# Patient Record
Sex: FEMALE | Race: WHITE
[De-identification: names, ages, dates, MRNs, and addresses within clinical notes are randomized per-mention and may not be internally consistent; named-entity substitution may affect disease eponyms.]

---

## 2019-07-03 ENCOUNTER — HOSPITAL ENCOUNTER (INPATIENT)
Dept: HOSPITAL 88 - ER | Age: 84
LOS: 4 days | Discharge: HOME HEALTH | DRG: 193 | End: 2019-07-07
Attending: INTERNAL MEDICINE | Admitting: INTERNAL MEDICINE
Payer: MEDICARE

## 2019-07-03 VITALS — WEIGHT: 111.06 LBS | HEIGHT: 62 IN | BODY MASS INDEX: 20.44 KG/M2

## 2019-07-03 DIAGNOSIS — F03.91: ICD-10-CM

## 2019-07-03 DIAGNOSIS — I10: ICD-10-CM

## 2019-07-03 DIAGNOSIS — M62.82: ICD-10-CM

## 2019-07-03 DIAGNOSIS — G93.41: ICD-10-CM

## 2019-07-03 DIAGNOSIS — R74.0: ICD-10-CM

## 2019-07-03 DIAGNOSIS — J18.1: Primary | ICD-10-CM

## 2019-07-03 DIAGNOSIS — E78.5: ICD-10-CM

## 2019-07-03 LAB
ALBUMIN SERPL-MCNC: 2.7 G/DL (ref 3.5–5)
ALBUMIN/GLOB SERPL: 0.6 {RATIO} (ref 0.8–2)
ALP SERPL-CCNC: 91 IU/L (ref 40–150)
ALT SERPL-CCNC: 39 IU/L (ref 0–55)
ANION GAP SERPL CALC-SCNC: 17.3 MMOL/L (ref 8–16)
BACTERIA URNS QL MICRO: (no result) /HPF
BASOPHILS # BLD AUTO: 0.1 10*3/UL (ref 0–0.1)
BASOPHILS NFR BLD AUTO: 0.3 % (ref 0–1)
BILIRUB UR QL: NEGATIVE
BUN SERPL-MCNC: 31 MG/DL (ref 7–26)
BUN/CREAT SERPL: 34 (ref 6–25)
CALCIUM SERPL-MCNC: 12 MG/DL (ref 8.4–10.2)
CHLORIDE SERPL-SCNC: 103 MMOL/L (ref 98–107)
CK MB SERPL-MCNC: 5.6 NG/ML (ref 0–5)
CK SERPL-CCNC: 1155 IU/L (ref 29–168)
CLARITY UR: CLEAR
CO2 SERPL-SCNC: 25 MMOL/L (ref 22–29)
COLOR UR: YELLOW
DEPRECATED NEUTROPHILS # BLD AUTO: 12.1 10*3/UL (ref 2.1–6.9)
DEPRECATED RBC URNS MANUAL-ACNC: (no result) /HPF (ref 0–5)
EGFRCR SERPLBLD CKD-EPI 2021: 59 ML/MIN (ref 60–?)
EOSINOPHIL # BLD AUTO: 0.1 10*3/UL (ref 0–0.4)
EOSINOPHIL NFR BLD AUTO: 0.7 % (ref 0–6)
EPI CELLS URNS QL MICRO: (no result) /LPF
ERYTHROCYTE [DISTWIDTH] IN CORD BLOOD: 14.1 % (ref 11.7–14.4)
GLOBULIN PLAS-MCNC: 4.9 G/DL (ref 2.3–3.5)
GLUCOSE SERPLBLD-MCNC: 115 MG/DL (ref 74–118)
HCT VFR BLD AUTO: 39.5 % (ref 34.2–44.1)
HGB BLD-MCNC: 12.7 G/DL (ref 12–16)
KETONES UR QL STRIP.AUTO: NEGATIVE
LEUKOCYTE ESTERASE UR QL STRIP.AUTO: NEGATIVE
LIPASE SERPL-CCNC: 33 U/L (ref 8–78)
LYMPHOCYTES # BLD: 1.3 10*3/UL (ref 1–3.2)
LYMPHOCYTES NFR BLD AUTO: 8.7 % (ref 18–39.1)
MCH RBC QN AUTO: 30 PG (ref 28–32)
MCHC RBC AUTO-ENTMCNC: 32.2 G/DL (ref 31–35)
MCV RBC AUTO: 93.2 FL (ref 81–99)
MONOCYTES # BLD AUTO: 1.1 10*3/UL (ref 0.2–0.8)
MONOCYTES NFR BLD AUTO: 7.3 % (ref 4.4–11.3)
NEUTS SEG NFR BLD AUTO: 82.6 % (ref 38.7–80)
NITRITE UR QL STRIP.AUTO: NEGATIVE
PLATELET # BLD AUTO: 374 X10E3/UL (ref 140–360)
POTASSIUM SERPL-SCNC: 4.3 MMOL/L (ref 3.5–5.1)
PROT UR QL STRIP.AUTO: (no result)
RBC # BLD AUTO: 4.24 X10E6/UL (ref 3.6–5.1)
SODIUM SERPL-SCNC: 141 MMOL/L (ref 136–145)
SP GR UR STRIP: 1.02 (ref 1.01–1.02)
UROBILINOGEN UR STRIP-MCNC: 0.2 MG/DL (ref 0.2–1)
WBC #/AREA URNS HPF: (no result) /HPF (ref 0–5)

## 2019-07-03 PROCEDURE — 82140 ASSAY OF AMMONIA: CPT

## 2019-07-03 PROCEDURE — 83880 ASSAY OF NATRIURETIC PEPTIDE: CPT

## 2019-07-03 PROCEDURE — 82550 ASSAY OF CK (CPK): CPT

## 2019-07-03 PROCEDURE — 99284 EMERGENCY DEPT VISIT MOD MDM: CPT

## 2019-07-03 PROCEDURE — 83970 ASSAY OF PARATHORMONE: CPT

## 2019-07-03 PROCEDURE — 82553 CREATINE MB FRACTION: CPT

## 2019-07-03 PROCEDURE — 93005 ELECTROCARDIOGRAM TRACING: CPT

## 2019-07-03 PROCEDURE — 83735 ASSAY OF MAGNESIUM: CPT

## 2019-07-03 PROCEDURE — 83690 ASSAY OF LIPASE: CPT

## 2019-07-03 PROCEDURE — 80076 HEPATIC FUNCTION PANEL: CPT

## 2019-07-03 PROCEDURE — 84484 ASSAY OF TROPONIN QUANT: CPT

## 2019-07-03 PROCEDURE — 70450 CT HEAD/BRAIN W/O DYE: CPT

## 2019-07-03 PROCEDURE — 80053 COMPREHEN METABOLIC PANEL: CPT

## 2019-07-03 PROCEDURE — 82306 VITAMIN D 25 HYDROXY: CPT

## 2019-07-03 PROCEDURE — 94640 AIRWAY INHALATION TREATMENT: CPT

## 2019-07-03 PROCEDURE — 87086 URINE CULTURE/COLONY COUNT: CPT

## 2019-07-03 PROCEDURE — 83605 ASSAY OF LACTIC ACID: CPT

## 2019-07-03 PROCEDURE — 87040 BLOOD CULTURE FOR BACTERIA: CPT

## 2019-07-03 PROCEDURE — 97139 UNLISTED THERAPEUTIC PX: CPT

## 2019-07-03 PROCEDURE — 81001 URINALYSIS AUTO W/SCOPE: CPT

## 2019-07-03 PROCEDURE — 36415 COLL VENOUS BLD VENIPUNCTURE: CPT

## 2019-07-03 PROCEDURE — 85025 COMPLETE CBC W/AUTO DIFF WBC: CPT

## 2019-07-03 PROCEDURE — 92522 EVALUATE SPEECH PRODUCTION: CPT

## 2019-07-03 PROCEDURE — 71045 X-RAY EXAM CHEST 1 VIEW: CPT

## 2019-07-03 PROCEDURE — 80048 BASIC METABOLIC PNL TOTAL CA: CPT

## 2019-07-03 RX ADMIN — CEFTRIAXONE SCH GM: 100 INJECTION, POWDER, FOR SOLUTION INTRAVENOUS at 23:43

## 2019-07-03 NOTE — DIAGNOSTIC IMAGING REPORT
EXAMINATION:  CHEST SINGLE (PORTABLE)    



INDICATION:      Elevated WBC



COMPARISON:  None

     

FINDINGS:  AP view   



TUBES and LINES:  None.



LUNGS:  Lungs are hyper inflated.  Bilateral peribronchial cuffing. Focal

airspace opacity in the right upper lobe.



PLEURA:  No pleural effusion or pneumothorax.



HEART AND MEDIASTINUM:  The cardiomediastinal silhouette is unremarkable. There

are atherosclerotic calcifications within the aorta.



BONES AND SOFT TISSUES:  No acute osseous lesion.  Soft tissues are

unremarkable.



UPPER ABDOMEN: No free air under the diaphragm.    



IMPRESSION: 

Right upper lobe pneumonia.





Signed by: Dr. Justus Colbert M.D. on 7/3/2019 9:50 PM

## 2019-07-03 NOTE — DIAGNOSTIC IMAGING REPORT
History:AMS

Comparison studies:None



Technique:

Axial images were obtained from the skull base to the vertex.

Coronal and sagittal images reconstructed from the axial data.

Intravenous contrast: None

Dose modulation, iterative reconstruction, and/or weight based adjustment of

the mA/kV was utilized to reduce the radiation dose to as low as reasonably

achievable.



Findings:



Scalp/skull: 

No abnormalities.



Extra-axial spaces: 

No masses.  No fluid collections.



Brain sulci: Severely prominent at the frontal temporal and parietal

convexities.

Ventricles: Moderate compensatory dilatation. Severe dilation of the left

temporal horn. No hydrocephalus.



Parenchyma: 

Few hypodensities in the supratentorial white matter are small vessel ischemic

changes. No masses, hemorrhage, acute or chronic cortical vascular insults.



Sellar/suprasellar region: No abnormalities.

Craniocervical junction: Patent foramen magnum.  No Chiari one malformation.



Incidental findings:

Atherosclerotic calcifications in the carotid siphons .



Impression:



No acute abnormalities.



Chronic findings:

1.  Severe cortical volume loss at the frontal, temporal and parietal

convexities.

2.  Mild supratentorial white matter small vessel ischemic changes.



Signed by: DR Kishore Patricio M.D. on 7/3/2019 10:06 PM

## 2019-07-03 NOTE — XMS REPORT
Clinical Summary

                             Created on: 2019



Veronica Lai

External Reference #: [id number]

: 1935

Sex: Female



Demographics







                          Address                   [address] [place]  [address]

 

                          Home Phone                [phone]

 

                          Preferred Language        English

 

                          Marital Status            

 

                          Zoroastrian Affiliation     Unknown

 

                          Race                      White

 

                          Ethnic Group              Non-





Author







                          Author                    [facility]

 

                          Organization              [facility]

 

                          Address                   Unknown

 

                          Phone                     Unavailable







Support







                Name            Relationship    Address         Phone

 

                    Sadiq Larsen      [employer]                [address] [place]  [address]               [phone]







Care Team Providers







                    Care Team Member Name    Role                Phone

 

                    Vijay Delavlle MD    PCP                 [phone]







Allergies

Not on File



Medications

Not on file



Active Problems





Not on file



Social History







                                        Date



                 Tobacco Use     Types           Packs/Day       Years Used 

 

                                         



                                         Never Assessed    









 



                           Sex Assigned at Birth     Date Recorded

 

 



                                         Not on file 









                                        Industry



                           Job Start Date            Occupation 

 

                                        Not on file



                           Not on file               Not on file 









                                        Travel End



                           Travel History            Travel Start 

 





                                         No recent travel history available.







Last Filed Vital Signs

Not on file



Plan of Treatment







   



                 Health Maintenance     Due Date        Last Done       Comments

 

   



                           SHINGLES VACCINES (#1)     1985  

 

   



                           65+ PNEUMOCOCCAL VACCINE     2000  



                                         (1 of 2 - PCV13)   

 

   



                           INFLUENZA VACCINE         2019  







Results

Not on fileafter 2018



Insurance







                                        Type



            Payer      Benefit     Subscriber ID     Effective     Phone      Address 



                           Plan /                    Dates   



                                         Group     

 

                                        Commercial



                 PHYSICIANS MUTUAL     PHYSICIANS      xxxxxxxxxx      3/1/2008-P   



                           MUTUAL                    resent   

 

                                        Medicare



              MEDICARE     MEDICARE     xxxxxxxxxx     2000-      [place], 



                     PART A AND          Present             [place] 



                                         B     









     



            Guarantor Name     Account     Relation to     Date of     Phone      Billing Address



                     Type                Patient             Birth  

 

     



            BerlinradhaVeronica freeman     Personal/F     Self       1935     [phone]     [address]               (Home)              [place] [address]







Advance Directives





Patient has advance care planning documents on file. For more information, mark ramos contact:



[facility]



[address] [place] [address]

## 2019-07-03 NOTE — XMS REPORT
Patient Summary Document

                             Created on: 2019



STEVAN RAYA

External Reference #: [id number]

: 1935

Sex: Female



Demographics







                          Address                   [address] [id number]

 

                          Home Phone                [phone]

 

                          Preferred Language        Unknown

 

                          Marital Status            Unknown

 

                          Islam Affiliation     Unknown

 

                          Race                      Unknown

 

                                        Additional Race(s)  

 

                          Ethnic Group              Unknown





Author







                          Author                    [facility]

 

                          [facility]

 

                          Address                   Unknown

 

                          Phone                     Unavailable







Care Team Providers







                    Care Team Member Name    Role                Phone

 

                    SEAMUS GERMAIN    Unavailable         Unavailable







Problems

This patient has no known problems.



Allergies, Adverse Reactions, Alerts

This patient has no known allergies or adverse reactions.



Medications

This patient has no known medications.



Results







           Test Description    Test Time    Test Comments    Text Results    Atomic Results    Result

 Comments

 

                CT BRAIN WO     2019 22:02:00                                                               

                                           [facility]    
                   [address] [address]    
 Patient Name: STEVAN NEVAREZ                                   MR #: 
[id number]                     : 1935                                  
Age/Sex: 83/F  Acct #: [id number]                              Req #: [id number]
[id number]  Adm Physician:                                                      
Ordered by: LINDA GERMAIN MD                            Report #: [id number]  
     Location: ER                                      Room/Bed:                
    
___________________________________________________________________________________________________
   Procedure: [id number] CT/CT BRAIN WO  Exam Date:                             
Exam Time:                                               REPORT STATUS: Signed  
 History:AMS   Comparison studies:None      Technique:   Axial images were 
obtained from the skull base to the vertex.   Coronal and sagittal images 
reconstructed from the axial data.   Intravenous contrast: None   Dose 
modulation, iterative reconstruction, and/or weight based adjustment of   the 
mA/kV was utilized to reduce the radiation dose to as low as reasonably   
achievable.      Findings:      Scalp/skull:    No abnormalities.      Extra-
axial spaces:    No masses.  No fluid collections.      Brain sulci: Severely 
prominent at the frontal temporal and parietal   convexities.   Ventricles: 
Moderate compensatory dilatation. Severe dilation of the left   temporal horn. 
No hydrocephalus.      Parenchyma:    Few hypodensities in the supratentorial 
white matter are small vessel ischemic   changes. No masses, hemorrhage, acute 
or chronic cortical vascular insults.      Sellar/suprasellar region: No 
abnormalities.   Craniocervical junction: Patent foramen magnum.  No Chiari one 
malformation.      Incidental findings:   Atherosclerotic calcifications in the 
carotid siphons .      Impression:      No acute abnormalities.      Chronic 
findings:   1.  Severe cortical volume loss at the frontal, temporal and 
parietal   convexities.   2.  Mild supratentorial white matter small vessel 
ischemic changes.      Signed by: DR Kishore Patricio M.D. on 7/3/2019 
10:06 PM        Dictated By: KISHORE KRISHNA MD  Electronically Signed By: 
KISHORE KRISHNA MD on 19  Transcribed By: KATIE on 19
      COPY TO:   LINDA GERMAIN MD              

 

                CHEST SINGLE (PORTABLE)    2019 21:48:00                                                   

                                                       [address] [address]      Patient Name: STEVAN NEVAREZ                   
               MR #: [id number]                     : 1935             
                     Age/Sex: 83/F  Acct #: [id number]                        
     Req #: [id number]  Adm Physician:                                          
           Ordered by: LINDA GERMAIN MD                            Report #: 
[id number]        Location: ER                                      Room/Bed:    
                
___________________________________________________________________________________________________
   Procedure: [id number] DX/CHEST SINGLE (PORTABLE)  Exam Date:                  
          Exam Time:                                               REPORT 
STATUS: Signed    EXAMINATION:  CHEST SINGLE (PORTABLE)          INDICATION:    
 Elevated WBC      COMPARISON:  None           FINDINGS:  AP view         TUBES 
and LINES:  None.      LUNGS:  Lungs are hyper inflated.  Bilateral 
peribronchial cuffing. Focal   airspace opacity in the right upper lobe.      
PLEURA:  No pleural effusion or pneumothorax.      HEART AND MEDIASTINUM:  The 
cardiomediastinal silhouette is unremarkable. There   are atherosclerotic 
calcifications within the aorta.      BONES AND SOFT TISSUES:  No acute osseous 
lesion.  Soft tissues are   unremarkable.      UPPER ABDOMEN: No free air under 
the diaphragm.          IMPRESSION:    Right upper lobe pneumonia.         
Signed by: Dr. Otilia Smith M.D. on 7/3/2019 9:50 PM        Dictated By: OTILIA SMITH MD 
Electronically Signed By: OTILIA SIMTH MD on 19  Transcribed By: KATIE 
on 19       COPY TO:   LINDA GERMAIN MD

## 2019-07-03 NOTE — XMS REPORT
Patient Summary Document

                             Created on: 2019



STEVAN NEVAREZ

External Reference #: [id number]

: 1935

Sex: Female



Demographics







                          Address                   [address] [place] [address]

 

                          Preferred Language        Unknown

 

                          Marital Status            Unknown

 

                          Buddhism Affiliation     Unknown

 

                          Race                      Unknown

 

                          Ethnic Group              Unknown





Author







                          Author                    [facility]

 

                          Address                   Unknown

 

                          Phone                     Unavailable







Care Team Providers







                    Care Team Member Name    Role                Phone

 

                          Unavailable               Unavailable







Problems

This patient has no known problems.



Allergies, Adverse Reactions, Alerts

This patient has no known allergies or adverse reactions.



Medications

This patient has no known medications.



Encounters







             Start Date/Time    End Date/Time    Encounter Type    Admission Type    Attending Clinicians

                    [facility]       Care Department     Encounter ID

 

        2019 21:53:00    2019 21:53:00    Emergency    E               [facility]    [facility]    7500

## 2019-07-04 VITALS — DIASTOLIC BLOOD PRESSURE: 75 MMHG | SYSTOLIC BLOOD PRESSURE: 156 MMHG

## 2019-07-04 VITALS — SYSTOLIC BLOOD PRESSURE: 168 MMHG | DIASTOLIC BLOOD PRESSURE: 69 MMHG

## 2019-07-04 VITALS — SYSTOLIC BLOOD PRESSURE: 133 MMHG | DIASTOLIC BLOOD PRESSURE: 58 MMHG

## 2019-07-04 VITALS — DIASTOLIC BLOOD PRESSURE: 84 MMHG | SYSTOLIC BLOOD PRESSURE: 160 MMHG

## 2019-07-04 VITALS — SYSTOLIC BLOOD PRESSURE: 122 MMHG | DIASTOLIC BLOOD PRESSURE: 57 MMHG

## 2019-07-04 VITALS — SYSTOLIC BLOOD PRESSURE: 149 MMHG | DIASTOLIC BLOOD PRESSURE: 66 MMHG

## 2019-07-04 VITALS — SYSTOLIC BLOOD PRESSURE: 156 MMHG | DIASTOLIC BLOOD PRESSURE: 75 MMHG

## 2019-07-04 LAB
ALBUMIN SERPL-MCNC: 2.4 G/DL (ref 3.5–5)
ALBUMIN/GLOB SERPL: 0.5 {RATIO} (ref 0.8–2)
ALP SERPL-CCNC: 80 IU/L (ref 40–150)
ALT SERPL-CCNC: 35 IU/L (ref 0–55)
ANION GAP SERPL CALC-SCNC: 14.1 MMOL/L (ref 8–16)
BASOPHILS # BLD AUTO: 0.1 10*3/UL (ref 0–0.1)
BASOPHILS NFR BLD AUTO: 0.5 % (ref 0–1)
BUN SERPL-MCNC: 23 MG/DL (ref 7–26)
BUN/CREAT SERPL: 31 (ref 6–25)
CALCIUM SERPL-MCNC: 11.1 MG/DL (ref 8.4–10.2)
CHLORIDE SERPL-SCNC: 108 MMOL/L (ref 98–107)
CK MB SERPL-MCNC: 4.9 NG/ML (ref 0–5)
CK SERPL-CCNC: 929 IU/L (ref 29–168)
CO2 SERPL-SCNC: 26 MMOL/L (ref 22–29)
DEPRECATED NEUTROPHILS # BLD AUTO: 8.7 10*3/UL (ref 2.1–6.9)
EGFRCR SERPLBLD CKD-EPI 2021: > 60 ML/MIN (ref 60–?)
EOSINOPHIL # BLD AUTO: 0.2 10*3/UL (ref 0–0.4)
EOSINOPHIL NFR BLD AUTO: 1.6 % (ref 0–6)
ERYTHROCYTE [DISTWIDTH] IN CORD BLOOD: 14.3 % (ref 11.7–14.4)
GLOBULIN PLAS-MCNC: 4.4 G/DL (ref 2.3–3.5)
GLUCOSE SERPLBLD-MCNC: 102 MG/DL (ref 74–118)
HCT VFR BLD AUTO: 39.3 % (ref 34.2–44.1)
HGB BLD-MCNC: 12.5 G/DL (ref 12–16)
LYMPHOCYTES # BLD: 1.1 10*3/UL (ref 1–3.2)
LYMPHOCYTES NFR BLD AUTO: 10.3 % (ref 18–39.1)
MCH RBC QN AUTO: 30 PG (ref 28–32)
MCHC RBC AUTO-ENTMCNC: 31.8 G/DL (ref 31–35)
MCV RBC AUTO: 94.5 FL (ref 81–99)
MONOCYTES # BLD AUTO: 0.9 10*3/UL (ref 0.2–0.8)
MONOCYTES NFR BLD AUTO: 8 % (ref 4.4–11.3)
NEUTS SEG NFR BLD AUTO: 79.1 % (ref 38.7–80)
PLATELET # BLD AUTO: 307 X10E3/UL (ref 140–360)
POTASSIUM SERPL-SCNC: 4.1 MMOL/L (ref 3.5–5.1)
RBC # BLD AUTO: 4.16 X10E6/UL (ref 3.6–5.1)
SODIUM SERPL-SCNC: 144 MMOL/L (ref 136–145)

## 2019-07-04 RX ADMIN — SODIUM CHLORIDE SCH MLS/HR: 9 INJECTION, SOLUTION INTRAVENOUS at 03:00

## 2019-07-04 RX ADMIN — GUAIFENESIN SCH MG: 100 SOLUTION ORAL at 12:30

## 2019-07-04 RX ADMIN — GUAIFENESIN SCH MG: 100 SOLUTION ORAL at 23:55

## 2019-07-04 RX ADMIN — SODIUM CHLORIDE SCH MG: 900 INJECTION, SOLUTION INTRAVENOUS at 01:43

## 2019-07-04 RX ADMIN — FAMOTIDINE SCH MG: 20 TABLET, FILM COATED ORAL at 16:30

## 2019-07-04 RX ADMIN — MEMANTINE HYDROCHLORIDE SCH MG: 10 TABLET ORAL at 17:19

## 2019-07-04 RX ADMIN — SIMVASTATIN SCH MG: 20 TABLET, FILM COATED ORAL at 21:45

## 2019-07-04 RX ADMIN — MEGESTROL ACETATE SCH MG: 40 TABLET ORAL at 17:19

## 2019-07-04 RX ADMIN — CEFTRIAXONE SCH GM: 100 INJECTION, POWDER, FOR SOLUTION INTRAVENOUS at 23:44

## 2019-07-04 RX ADMIN — SODIUM CHLORIDE SCH MLS/HR: 9 INJECTION, SOLUTION INTRAVENOUS at 18:26

## 2019-07-04 RX ADMIN — METRONIDAZOLE SCH MLS/HR: 500 INJECTION, SOLUTION INTRAVENOUS at 05:49

## 2019-07-04 RX ADMIN — METOPROLOL TARTRATE SCH MG: 25 TABLET, FILM COATED ORAL at 17:19

## 2019-07-04 RX ADMIN — METRONIDAZOLE SCH MLS/HR: 500 INJECTION, SOLUTION INTRAVENOUS at 00:04

## 2019-07-04 RX ADMIN — GUAIFENESIN SCH MG: 100 SOLUTION ORAL at 18:08

## 2019-07-04 NOTE — DIAGNOSTIC IMAGING REPORT
EXAMINATION:  CHEST SINGLE (PORTABLE)    



INDICATION:      

^R/O PNA

^[id number]

^[id number]



COMPARISON:  7/3/2019

     

FINDINGS:  AP view   



TUBES and LINES:  None.



LUNGS:  Lungs are well inflated.  Bilateral upper lobe hazy opacities, right

greater than left.      



PLEURA:  No pleural effusion or pneumothorax.



HEART AND MEDIASTINUM:  The cardiomediastinal silhouette is unremarkable.    



BONES AND SOFT TISSUES:  No acute osseous lesion.  Soft tissues are

unremarkable.



UPPER ABDOMEN: No free air under the diaphragm.    



IMPRESSION: 

Bilateral upper lobe hazy opacities, right greater than left, concerning for

pneumonia, unchanged from prior exam.





Signed by: Dr. Luis Eduardo Christie MD on 7/4/2019 10:26 AM

## 2019-07-04 NOTE — NUR
PATIENT SITTING UP IN BED, ASSISTED WITH DINNER TRAY, NO SWALLOWING DIFFICULTY OBSERVED. 
CALL LIGHT AT EASY REACH.

## 2019-07-04 NOTE — NUR
PATIENT IS ALERT AND SHOWS NO SIGNS OF DISTRESS. BED IS IN LOWEST POSITION AND LOCKED, BED 
ALARM IS ACTIVATED, SIDE RAILS ARE UP, CALL LIGHT WITHIN REACH, WILL CONTINUE TO MONITOR.

## 2019-07-04 NOTE — NUR
Bedside report walking rounds complete with day shift RN. Pt sleeping in bed and in no 
apparent distress. All safety measures ensured.

## 2019-07-04 NOTE — NUR
PATIENT IN BED RESTING WITH EYES CLOSED, NO RESPIRATORY DISTRESS OBSERVED. TELEMETRY BOX IN 
PLACE. BED IN LOWER POSITION, CALL LIGHT AT REACH. BED ALARM ACTIVATED.

## 2019-07-04 NOTE — NUR
BEDSIDE SWALLOWING TEST DONE. SPEECH THERAPIST RECOMMENDED PUREE DIET, NP NOTIFIED AND NEW 
ORDER RECEIVED.

## 2019-07-04 NOTE — NUR
Pt received from ER. Pt sleeping but reported to be A&O 1-2 with hx of dementia. Pt in no 
apparent distress. Pt on room air and tele w/ cont. pulse ox. All safety measures ensured, 
bed alarm on, and pt call bell near. Pt encouraged to use call bell for assistance.

## 2019-07-04 NOTE — CONSULTATION
DATE OF CONSULTATION:  07/04/2019

 

Pulmonary Critical Care Consultation 

 

HISTORY OF PRESENT ILLNESS:  The patient has a history of dementia.  She lives at home

with her family, but has been round-the-clock caretakers.  Apparently, she went to Dr. Limon's office yesterday and was found to have a urinary tract infection.  She was

subsequently sent to the ER.  The ER expressed concern about pneumonia and started

antibiotics.  The patient does not have any cough for congestion.  She is not having any

change in her normal respiratory status. 

 

PAST SURGICAL HISTORY:  

1. Status post cholecystectomy.

2. Status post hip surgery.

 

PAST MEDICAL HISTORY:  

1. Hypertension.

2. Dementia.

 

SOCIAL HISTORY:  The patient quit smoking 40 years ago.

 

ALLERGIES:  SHE HAS NO KNOWN DRUG ALLERGIES.

 

FAMILY HISTORY:  Noncontributory.

 

REVIEW OF SYSTEMS:

The patient has chronic dementia and confusion.  She has no fever.  She is not having

any chest pain.  She does not have cough.  She has no abdominal pain.  She has no nausea

or vomiting.  She has no leg edema. 

 

PHYSICAL EXAMINATION:

VITAL SIGNS:  The patient is afebrile.  The blood pressure is 168/70 and the saturation

is 98%.  Pulse is 87. 

HEENT:  Shows no facial swelling or erythema.  The nasal mucosa is normal.  The

oropharynx is normal. 

LYMPHATIC:  Shows no submandibular, cervical, or supraclavicular adenopathy. 

CARDIAC:  Reveals a regular rate and rhythm with a normal S1 and S2.  There are no

murmurs or rubs. 

LUNGS:  Auscultation of lungs reveals rhonchorous breath sounds bilaterally.  There is

no wheezing. 

ABDOMEN:  Soft, nontender.  There is no rebound or guarding. 

EXTREMITIES:  There is no leg edema or calf tenderness.  There is no cyanosis clubbing. 

SKIN:  Shows no rashes. 

NEUROLOGIC:  Shows the patient to be demented.  She does not move spontaneously.

LABORATORY DATA:  BUN to creatinine ratio is normal.  Electrolytes are normal.  CBC is

normal. 

 

RADIOGRAPHIC DATA:  Some hazy infiltrates in the upper lobes raising a concern for

pneumonia. 

 

IMPRESSION:  

1. Community-acquired pneumonia.

2. Metabolic encephalopathy.

3. Chronic dementia.

 

PLAN:  

1. Antibiotics.

2. Physical therapy.

3. DVT prophylaxis.

4. Decubitus prophylaxis.

 

 

 

 

______________________________

MD SHARLA Millan/RAJENDRA

D:  07/04/2019 12:23:41

T:  07/04/2019 14:31:15

Job #:  [id number]

## 2019-07-05 VITALS — DIASTOLIC BLOOD PRESSURE: 72 MMHG | SYSTOLIC BLOOD PRESSURE: 152 MMHG

## 2019-07-05 VITALS — SYSTOLIC BLOOD PRESSURE: 125 MMHG | DIASTOLIC BLOOD PRESSURE: 60 MMHG

## 2019-07-05 VITALS — SYSTOLIC BLOOD PRESSURE: 128 MMHG | DIASTOLIC BLOOD PRESSURE: 83 MMHG

## 2019-07-05 VITALS — DIASTOLIC BLOOD PRESSURE: 85 MMHG | SYSTOLIC BLOOD PRESSURE: 159 MMHG

## 2019-07-05 VITALS — DIASTOLIC BLOOD PRESSURE: 78 MMHG | SYSTOLIC BLOOD PRESSURE: 142 MMHG

## 2019-07-05 VITALS — SYSTOLIC BLOOD PRESSURE: 159 MMHG | DIASTOLIC BLOOD PRESSURE: 75 MMHG

## 2019-07-05 LAB
ALBUMIN SERPL-MCNC: 2 G/DL (ref 3.5–5)
ALP SERPL-CCNC: 62 IU/L (ref 40–150)
ALT SERPL-CCNC: 25 IU/L (ref 0–55)
AMMONIA SER-MCNC: 41 UG/DL (ref 31–123)
ANION GAP SERPL CALC-SCNC: 12.5 MMOL/L (ref 8–16)
BASOPHILS # BLD AUTO: 0.1 10*3/UL (ref 0–0.1)
BASOPHILS NFR BLD AUTO: 0.6 % (ref 0–1)
BILIRUB CONJ SERPL-MCNC: 0.2 MG/DL (ref 0–0.5)
BNP BLD-MCNC: 215.8 PG/ML (ref 0–100)
BUN SERPL-MCNC: 17 MG/DL (ref 7–26)
BUN/CREAT SERPL: 27 (ref 6–25)
CALCIUM SERPL-MCNC: 10 MG/DL (ref 8.4–10.2)
CHLORIDE SERPL-SCNC: 115 MMOL/L (ref 98–107)
CK MB SERPL-MCNC: 2.3 NG/ML (ref 0–5)
CK SERPL-CCNC: 323 IU/L (ref 29–168)
CO2 SERPL-SCNC: 24 MMOL/L (ref 22–29)
DEPRECATED NEUTROPHILS # BLD AUTO: 9.3 10*3/UL (ref 2.1–6.9)
EGFRCR SERPLBLD CKD-EPI 2021: > 60 ML/MIN (ref 60–?)
EOSINOPHIL # BLD AUTO: 0.2 10*3/UL (ref 0–0.4)
EOSINOPHIL NFR BLD AUTO: 1.7 % (ref 0–6)
ERYTHROCYTE [DISTWIDTH] IN CORD BLOOD: 14 % (ref 11.7–14.4)
GLUCOSE SERPLBLD-MCNC: 103 MG/DL (ref 74–118)
HCT VFR BLD AUTO: 33.8 % (ref 34.2–44.1)
HGB BLD-MCNC: 10.8 G/DL (ref 12–16)
LYMPHOCYTES # BLD: 1.2 10*3/UL (ref 1–3.2)
LYMPHOCYTES NFR BLD AUTO: 10.1 % (ref 18–39.1)
MCH RBC QN AUTO: 29.9 PG (ref 28–32)
MCHC RBC AUTO-ENTMCNC: 32 G/DL (ref 31–35)
MCV RBC AUTO: 93.6 FL (ref 81–99)
MONOCYTES # BLD AUTO: 0.7 10*3/UL (ref 0.2–0.8)
MONOCYTES NFR BLD AUTO: 5.7 % (ref 4.4–11.3)
NEUTS SEG NFR BLD AUTO: 81.5 % (ref 38.7–80)
PLATELET # BLD AUTO: 327 X10E3/UL (ref 140–360)
POTASSIUM SERPL-SCNC: 3.5 MMOL/L (ref 3.5–5.1)
RBC # BLD AUTO: 3.61 X10E6/UL (ref 3.6–5.1)
SODIUM SERPL-SCNC: 148 MMOL/L (ref 136–145)

## 2019-07-05 RX ADMIN — SIMVASTATIN SCH MG: 20 TABLET, FILM COATED ORAL at 21:40

## 2019-07-05 RX ADMIN — GUAIFENESIN SCH MG: 100 SOLUTION ORAL at 06:00

## 2019-07-05 RX ADMIN — MEMANTINE HYDROCHLORIDE SCH MG: 10 TABLET ORAL at 11:22

## 2019-07-05 RX ADMIN — IPRATROPIUM BROMIDE AND ALBUTEROL SULFATE SCH ML: .5; 2.5 SOLUTION RESPIRATORY (INHALATION) at 20:08

## 2019-07-05 RX ADMIN — IPRATROPIUM BROMIDE AND ALBUTEROL SULFATE SCH ML: .5; 2.5 SOLUTION RESPIRATORY (INHALATION) at 23:22

## 2019-07-05 RX ADMIN — FAMOTIDINE SCH MG: 20 TABLET, FILM COATED ORAL at 17:09

## 2019-07-05 RX ADMIN — MEMANTINE HYDROCHLORIDE SCH MG: 10 TABLET ORAL at 17:31

## 2019-07-05 RX ADMIN — SODIUM CHLORIDE SCH MG: 900 INJECTION, SOLUTION INTRAVENOUS at 00:30

## 2019-07-05 RX ADMIN — IPRATROPIUM BROMIDE AND ALBUTEROL SULFATE SCH ML: .5; 2.5 SOLUTION RESPIRATORY (INHALATION) at 07:00

## 2019-07-05 RX ADMIN — METOPROLOL TARTRATE SCH MG: 25 TABLET, FILM COATED ORAL at 17:32

## 2019-07-05 RX ADMIN — GUAIFENESIN SCH MG: 100 SOLUTION ORAL at 11:28

## 2019-07-05 RX ADMIN — AMLODIPINE BESYLATE SCH MG: 10 TABLET ORAL at 11:22

## 2019-07-05 RX ADMIN — METOPROLOL TARTRATE SCH MG: 25 TABLET, FILM COATED ORAL at 11:23

## 2019-07-05 RX ADMIN — FAMOTIDINE SCH MG: 20 TABLET, FILM COATED ORAL at 11:28

## 2019-07-05 RX ADMIN — MEGESTROL ACETATE SCH MG: 40 TABLET ORAL at 17:31

## 2019-07-05 RX ADMIN — SODIUM CHLORIDE SCH MLS/HR: 9 INJECTION, SOLUTION INTRAVENOUS at 06:18

## 2019-07-05 RX ADMIN — GUAIFENESIN SCH MG: 100 SOLUTION ORAL at 17:51

## 2019-07-05 RX ADMIN — IPRATROPIUM BROMIDE AND ALBUTEROL SULFATE SCH ML: .5; 2.5 SOLUTION RESPIRATORY (INHALATION) at 13:00

## 2019-07-05 RX ADMIN — DONEPEZIL HYDROCHLORIDE SCH MG: 5 TABLET, FILM COATED ORAL at 11:21

## 2019-07-05 RX ADMIN — SODIUM CHLORIDE SCH MLS/HR: 9 INJECTION, SOLUTION INTRAVENOUS at 09:00

## 2019-07-05 RX ADMIN — LOSARTAN POTASSIUM SCH MG: 100 TABLET, FILM COATED ORAL at 11:23

## 2019-07-05 RX ADMIN — MEGESTROL ACETATE SCH MG: 40 TABLET ORAL at 11:21

## 2019-07-05 NOTE — NUR
PATIENT WAS NONCOMPLIANT FOR 03:00 LAB DRAWS. TRIED TO GET PATIENT TO STOP MOVING ARM BUT 
CONTINUED TO MOVE OUT OF PLACE AND VOICED SHE WANTED TO SLEEP. WILL FOLLOW UP WITH 
PHLEBOTOMY AT 05:00 LAB DRAWS AND CONTINUE TO MONITOR SITUATION.

## 2019-07-05 NOTE — PROGRESS NOTE
DATE:  07/05/2019

 

Pulmonary Critical Care Progress Note 

 

SUBJECTIVE:  The patient has no fever.  She has less congestion.  Her mental status is

abnormal, but is at her baseline. 

 

PHYSICAL EXAMINATION:

VITAL SIGNS:  The patient is afebrile.  The blood pressure is 142/78, pulse is 93, and

the saturation is 96%. 

HEENT:  Shows no facial swelling or erythema. 

CARDIAC:  Reveals regular rate and rhythm with normal S1, S2.  There are no murmurs or

rubs heard. 

LUNGS:  Auscultation of lungs reveals rhonchorous breath sounds bilaterally.  There is

no wheezing. 

ABDOMEN:  Soft, nontender.  There is no rebound or guarding. 

EXTREMITIES:  Show no leg edema or calf tenderness.

IMPRESSION:  

1. Community-acquired pneumonia.

2. Metabolic encephalopathy.

3. Chronic dementia with poor mental status at baseline.

4. Hypertension.

 

PLAN:  

1. Repeat x-ray.

2. Change to oral antibiotics.

3. Continue enteral feedings.

4. The patient and family desired DNR status.

5. Possible discharge tomorrow.

 

 

 

 

______________________________

MD SHARLA Millan/RAJENDRA

D:  07/05/2019 13:07:16

T:  07/05/2019 15:43:27

Job #:  [id number]

## 2019-07-05 NOTE — NUR
Nutrition Intervention Note



RD Recommendation(s) for Physician: 

The patient meets criteria for MODERATE protein-calorie malnutrition. 

-Continue cardiac diet as ordered; diet texture per SLP rec

-Rec Ensure Enlive BID to increase protein-calorie intake

-Continue Megace to increase appetite 



Plan of Care: RD following, monitoring for tolerance and adequacy, ONS rec



Nutrition reason for involvement: MD consult 



RD Assessment

7/5  84yo F, who was admitted for PNA. Visited pt in the room. Pt with hx of dementia, 
unable to provide any info. She lives at home with her family, but has been with 
round-the-clock caretakers. Per family on bedside, pt ate well today with ~40% lunch intake. 
Tolerating current diet texture ordered. No complains of nausea or vomiting. Pt usually 
drinks 1-2 Ensure/ Premium protein shakes at home. Per daughter, pt might have lost ~5lbs in 
a year. Her UBW is between 105-110lbs. Pt has some moderate muscle and fat loss upon NFPA. 
Megace has been ordered to increase appetite. Will continue to monitor and follow. 



Principal Problems/Diagnoses: 

1. Community-acquired pneumonia.

2. Metabolic encephalopathy.

3. Chronic dementia.



PMH: Dementia, HTN 



GI: abdomen flat, soft, non-tender, LBM  7/5 



Skin: no pressure wound noted



Labs: (7/5) Na 148 H, AST 36 H  



Meds: pepcid, megace, abx 



Ht: 62in

Wt: 105lb  

BMI: 19.3kg/m2

IBW: 110lb +/- 10%



Malnutrition Evaluation (7/5/2019)

The patient meets criteria for MODERATE protein-calorie malnutrition. 



Energy intake:

<75% of estimated energy requirements for >3 months



Weight loss:

10% weight loss in a year - not meeting criteria 



Fat loss: Moderate  clavicle protrusion

Muscle loss: Moderate  temporal depression, some protrusion of acromion process 



Supporting Evidence:

Fluid accumulation: None 

Functional Status: no changes



Nutrition Prescription (Diet Order): cardiac diet (pureed) 



Estimated Nutritional Needs:

Calories: 1200  1680kcal(25-35kcal/kg/d) Weight used: CBW 

Protein: 48  72g(1-1.5g/kg/d) Weight used: CBW 



Diet Adequacy:

Not meeting calorie needs, Not meeting protein needs



Diet Education Needs Assessment:

Diet education not indicated. 



Nutrition Care Level: Mod



Nutrition Diagnosis: Moderate malnutrition related to inadequate energy intake as evidenced 
by <75% of estimated energy requirements for >3 months and moderate muscle/ fat loss. 



Goal: Patient will meet % of estimated needs by follow up 



Progress: Progressing



Interventions:

Modified diet, Commercial beverage, Recommended Modifications, Collaboration with other 
providers



Monitoring/Evaluation:

Total energy intake, Total protein intake, Modified diet, Liquid supplement, Weight change





Signed: Katiuska Merrill MS, RD, LD

## 2019-07-06 VITALS — DIASTOLIC BLOOD PRESSURE: 71 MMHG | SYSTOLIC BLOOD PRESSURE: 159 MMHG

## 2019-07-06 VITALS — SYSTOLIC BLOOD PRESSURE: 183 MMHG | DIASTOLIC BLOOD PRESSURE: 90 MMHG

## 2019-07-06 VITALS — SYSTOLIC BLOOD PRESSURE: 183 MMHG | DIASTOLIC BLOOD PRESSURE: 80 MMHG

## 2019-07-06 VITALS — DIASTOLIC BLOOD PRESSURE: 75 MMHG | SYSTOLIC BLOOD PRESSURE: 160 MMHG

## 2019-07-06 VITALS — SYSTOLIC BLOOD PRESSURE: 131 MMHG | DIASTOLIC BLOOD PRESSURE: 73 MMHG

## 2019-07-06 VITALS — DIASTOLIC BLOOD PRESSURE: 73 MMHG | SYSTOLIC BLOOD PRESSURE: 131 MMHG

## 2019-07-06 VITALS — SYSTOLIC BLOOD PRESSURE: 142 MMHG | DIASTOLIC BLOOD PRESSURE: 73 MMHG

## 2019-07-06 LAB
ANION GAP SERPL CALC-SCNC: 12.5 MMOL/L (ref 8–16)
BASOPHILS # BLD AUTO: 0 10*3/UL (ref 0–0.1)
BASOPHILS NFR BLD AUTO: 0.4 % (ref 0–1)
BUN SERPL-MCNC: 15 MG/DL (ref 7–26)
BUN/CREAT SERPL: 23 (ref 6–25)
CALCIUM SERPL-MCNC: 10.2 MG/DL (ref 8.4–10.2)
CHLORIDE SERPL-SCNC: 112 MMOL/L (ref 98–107)
CK SERPL-CCNC: 232 IU/L (ref 29–168)
CO2 SERPL-SCNC: 26 MMOL/L (ref 22–29)
DEPRECATED NEUTROPHILS # BLD AUTO: 7.9 10*3/UL (ref 2.1–6.9)
EGFRCR SERPLBLD CKD-EPI 2021: > 60 ML/MIN (ref 60–?)
EOSINOPHIL # BLD AUTO: 0.2 10*3/UL (ref 0–0.4)
EOSINOPHIL NFR BLD AUTO: 2.3 % (ref 0–6)
ERYTHROCYTE [DISTWIDTH] IN CORD BLOOD: 14.3 % (ref 11.7–14.4)
GLUCOSE SERPLBLD-MCNC: 107 MG/DL (ref 74–118)
HCT VFR BLD AUTO: 34 % (ref 34.2–44.1)
HGB BLD-MCNC: 11 G/DL (ref 12–16)
LYMPHOCYTES # BLD: 1.6 10*3/UL (ref 1–3.2)
LYMPHOCYTES NFR BLD AUTO: 15 % (ref 18–39.1)
MAGNESIUM SERPL-MCNC: 1.8 MG/DL (ref 1.3–2.1)
MCH RBC QN AUTO: 29.9 PG (ref 28–32)
MCHC RBC AUTO-ENTMCNC: 32.4 G/DL (ref 31–35)
MCV RBC AUTO: 92.4 FL (ref 81–99)
MONOCYTES # BLD AUTO: 0.6 10*3/UL (ref 0.2–0.8)
MONOCYTES NFR BLD AUTO: 5.7 % (ref 4.4–11.3)
NEUTS SEG NFR BLD AUTO: 75.9 % (ref 38.7–80)
PLATELET # BLD AUTO: 356 X10E3/UL (ref 140–360)
POTASSIUM SERPL-SCNC: 3.5 MMOL/L (ref 3.5–5.1)
RBC # BLD AUTO: 3.68 X10E6/UL (ref 3.6–5.1)
SODIUM SERPL-SCNC: 147 MMOL/L (ref 136–145)

## 2019-07-06 RX ADMIN — CEFTRIAXONE SCH GM: 100 INJECTION, POWDER, FOR SOLUTION INTRAVENOUS at 23:30

## 2019-07-06 RX ADMIN — CEFTRIAXONE SCH GM: 100 INJECTION, POWDER, FOR SOLUTION INTRAVENOUS at 00:05

## 2019-07-06 RX ADMIN — DONEPEZIL HYDROCHLORIDE SCH MG: 5 TABLET, FILM COATED ORAL at 08:46

## 2019-07-06 RX ADMIN — SODIUM CHLORIDE SCH MG: 900 INJECTION, SOLUTION INTRAVENOUS at 22:30

## 2019-07-06 RX ADMIN — MEGESTROL ACETATE SCH MG: 40 TABLET ORAL at 18:36

## 2019-07-06 RX ADMIN — LOSARTAN POTASSIUM SCH MG: 100 TABLET, FILM COATED ORAL at 08:48

## 2019-07-06 RX ADMIN — AMLODIPINE BESYLATE SCH MG: 10 TABLET ORAL at 08:48

## 2019-07-06 RX ADMIN — FAMOTIDINE SCH MG: 20 TABLET, FILM COATED ORAL at 08:37

## 2019-07-06 RX ADMIN — IPRATROPIUM BROMIDE AND ALBUTEROL SULFATE SCH ML: .5; 2.5 SOLUTION RESPIRATORY (INHALATION) at 13:00

## 2019-07-06 RX ADMIN — GUAIFENESIN SCH MG: 100 SOLUTION ORAL at 18:36

## 2019-07-06 RX ADMIN — METOPROLOL TARTRATE SCH MG: 25 TABLET, FILM COATED ORAL at 08:47

## 2019-07-06 RX ADMIN — GUAIFENESIN SCH MG: 100 SOLUTION ORAL at 00:00

## 2019-07-06 RX ADMIN — MEMANTINE HYDROCHLORIDE SCH MG: 10 TABLET ORAL at 08:48

## 2019-07-06 RX ADMIN — METOPROLOL TARTRATE SCH MG: 25 TABLET, FILM COATED ORAL at 18:36

## 2019-07-06 RX ADMIN — GUAIFENESIN SCH MG: 100 SOLUTION ORAL at 06:05

## 2019-07-06 RX ADMIN — SIMVASTATIN SCH MG: 20 TABLET, FILM COATED ORAL at 21:45

## 2019-07-06 RX ADMIN — FAMOTIDINE SCH MG: 20 TABLET, FILM COATED ORAL at 18:35

## 2019-07-06 RX ADMIN — GUAIFENESIN SCH MG: 100 SOLUTION ORAL at 18:00

## 2019-07-06 RX ADMIN — GUAIFENESIN SCH MG: 100 SOLUTION ORAL at 12:50

## 2019-07-06 RX ADMIN — IPRATROPIUM BROMIDE AND ALBUTEROL SULFATE SCH ML: .5; 2.5 SOLUTION RESPIRATORY (INHALATION) at 07:00

## 2019-07-06 RX ADMIN — SODIUM CHLORIDE SCH MG: 900 INJECTION, SOLUTION INTRAVENOUS at 00:40

## 2019-07-06 RX ADMIN — LOSARTAN POTASSIUM SCH MG: 100 TABLET, FILM COATED ORAL at 08:50

## 2019-07-06 RX ADMIN — MEGESTROL ACETATE SCH MG: 40 TABLET ORAL at 08:47

## 2019-07-06 RX ADMIN — MEMANTINE HYDROCHLORIDE SCH MG: 10 TABLET ORAL at 18:36

## 2019-07-06 NOTE — NUR
Report given to incoming night nurse, Darwin. Care for patient will be turned over to Darwin 
tonight.

## 2019-07-06 NOTE — NUR
Completed bedside report with nurse. Pt nonverbal. Pt lying in bed HOB 45 degrees. No s/s of 
pain at this time. Bed alarm on. Will continue to monitor.

## 2019-07-06 NOTE — PROGRESS NOTE
DATE:  07/06/2019

 

Pulmonary Critical Care Progress Note 

 

SUBJECTIVE:  The patient still has some confusion.  She appears to be at her baseline

according to the family.  She is afebrile. 

 

PHYSICAL EXAMINATION:

VITAL SIGNS:  The blood pressure is mildly elevated at 183/90. 

HEENT:  Shows no facial swelling or erythema. 

LYMPHATIC:  Shows no submandibular, cervical, or supraclavicular adenopathy. 

CARDIAC:  Reveals a regular rate and rhythm with normal S1 and S2.  There are no murmurs

or rubs heard. 

LUNGS:  Auscultation of lungs reveals rhonchorous breath sounds bilaterally.  There is

no wheezing. 

ABDOMEN:  Soft, nontender.  There is no rebound or guarding. 

EXTREMITIES:  Show no leg edema or calf tenderness.

IMPRESSION:  

1. Community-acquired pneumonia.

2. Metabolic encephalopathy.

3. Chronic dementia.

4. Hypertension.

 

PLAN:  

1. Change to oral antibiotics.

2. Continue enteral feedings.  The patient is DNR status. 

3. The patient appears to be at her baseline.

4. Possible discharge tomorrow.

 

 

 

 

______________________________

Jordon Walker MD

 

[facility]/RAJENDRA

D:  07/06/2019 09:51:25

T:  07/06/2019 11:29:35

Job #:  [id number]

## 2019-07-06 NOTE — NUR
ORDER RECEIVED FOR HOME PHYSICAL THERAPY.



MET W THE PT AND DTR / NANETTE AT THE BEDSIDE.

DAUGHTER EXPRESSED SHE WAS INTERESTED IN HOSPICE.  INFORMED NANETTE GERMAIN HAD NOT RECEIVED AN 
ORDER FOR HOSPICE, BUT JESSA CAN PROVIDE HER A LIST OF HOSPICE.

STATES SHE WAS TOLD HER MOTHER WOULD DC HOME TODAY.  PT IS [facility] @ 
[phone]. STATES HER MOTHER HAS PROVIDERS 7AM - 8PM DAILY.

DTR WANTED TO KNOW WHAT THE DOC RECOMMENDS.

CALL WAS PLACED TO [facility]IC[facility] OWENS W DR. GOODRICH.  GRACIELA STATES SHE DIDN'T FEEL THE PT WAS HOSPICE 
APPROPRIATE; DEMENTIA ONLY.  INFORMED HER THE DTR WAS ASKING.  GRACIELA REQUESTED TO REVIEW THE 
RECORD FIRST AND WOULD SPEAK W THE FAMILY TOMORROW.



DTR INQUIRED WHY WAS THE PT WAITING.  JESSA CALLED GRACIELA BACK.  STATES SHE WANTED THE FINAL 
CULTURE TO DETERMINE PT'S NEEDS AT SD.  DTR AGREED TO STAY TIL THE AM.  GRACIELA AGREED TO CALL 
DTR EARLY IN THE MORNING W ROUNDS.  JESSA WROTE DTR'S # ON THE DRY ERASE BOARD.

## 2019-07-06 NOTE — NUR
Pt visited in room during nursing rounds. Patient alert and oriented x1. Pt is aphasic but 
able to nod head in response to questions. Patient is bedridden and being turned Q2hrs. Bed 
alarm active. Pt is DNR. Call bell within reach. Will monitor pt closely.

## 2019-07-07 VITALS — DIASTOLIC BLOOD PRESSURE: 62 MMHG | SYSTOLIC BLOOD PRESSURE: 140 MMHG

## 2019-07-07 VITALS — DIASTOLIC BLOOD PRESSURE: 61 MMHG | SYSTOLIC BLOOD PRESSURE: 128 MMHG

## 2019-07-07 VITALS — SYSTOLIC BLOOD PRESSURE: 185 MMHG | DIASTOLIC BLOOD PRESSURE: 81 MMHG

## 2019-07-07 VITALS — DIASTOLIC BLOOD PRESSURE: 51 MMHG | SYSTOLIC BLOOD PRESSURE: 103 MMHG

## 2019-07-07 VITALS — DIASTOLIC BLOOD PRESSURE: 65 MMHG | SYSTOLIC BLOOD PRESSURE: 146 MMHG

## 2019-07-07 LAB
ANION GAP SERPL CALC-SCNC: 12.8 MMOL/L (ref 8–16)
BASOPHILS # BLD AUTO: 0 10*3/UL (ref 0–0.1)
BASOPHILS NFR BLD AUTO: 0.4 % (ref 0–1)
BUN SERPL-MCNC: 15 MG/DL (ref 7–26)
BUN/CREAT SERPL: 23 (ref 6–25)
CALCIUM SERPL-MCNC: 10.2 MG/DL (ref 8.4–10.2)
CHLORIDE SERPL-SCNC: 109 MMOL/L (ref 98–107)
CK SERPL-CCNC: 201 IU/L (ref 29–168)
CO2 SERPL-SCNC: 26 MMOL/L (ref 22–29)
DEPRECATED NEUTROPHILS # BLD AUTO: 6.9 10*3/UL (ref 2.1–6.9)
EGFRCR SERPLBLD CKD-EPI 2021: > 60 ML/MIN (ref 60–?)
EOSINOPHIL # BLD AUTO: 0.3 10*3/UL (ref 0–0.4)
EOSINOPHIL NFR BLD AUTO: 3 % (ref 0–6)
ERYTHROCYTE [DISTWIDTH] IN CORD BLOOD: 14.6 % (ref 11.7–14.4)
GLUCOSE SERPLBLD-MCNC: 102 MG/DL (ref 74–118)
HCT VFR BLD AUTO: 32 % (ref 34.2–44.1)
HGB BLD-MCNC: 10.4 G/DL (ref 12–16)
LYMPHOCYTES # BLD: 2.3 10*3/UL (ref 1–3.2)
LYMPHOCYTES NFR BLD AUTO: 22.1 % (ref 18–39.1)
MCH RBC QN AUTO: 30.2 PG (ref 28–32)
MCHC RBC AUTO-ENTMCNC: 32.5 G/DL (ref 31–35)
MCV RBC AUTO: 93 FL (ref 81–99)
MONOCYTES # BLD AUTO: 0.7 10*3/UL (ref 0.2–0.8)
MONOCYTES NFR BLD AUTO: 6.8 % (ref 4.4–11.3)
NEUTS SEG NFR BLD AUTO: 67.2 % (ref 38.7–80)
PLATELET # BLD AUTO: 355 X10E3/UL (ref 140–360)
POTASSIUM SERPL-SCNC: 3.8 MMOL/L (ref 3.5–5.1)
RBC # BLD AUTO: 3.44 X10E6/UL (ref 3.6–5.1)
SODIUM SERPL-SCNC: 144 MMOL/L (ref 136–145)

## 2019-07-07 RX ADMIN — FAMOTIDINE SCH MG: 20 TABLET, FILM COATED ORAL at 07:30

## 2019-07-07 RX ADMIN — GUAIFENESIN SCH MG: 100 SOLUTION ORAL at 00:00

## 2019-07-07 RX ADMIN — IPRATROPIUM BROMIDE AND ALBUTEROL SULFATE SCH ML: .5; 2.5 SOLUTION RESPIRATORY (INHALATION) at 06:56

## 2019-07-07 RX ADMIN — GUAIFENESIN SCH MG: 100 SOLUTION ORAL at 06:00

## 2019-07-07 NOTE — NUR
DISCHARGE DISPOSITION



PATIENT DISCHARGING HOME WITH RESUMPTION OF HOME HEALTH SERVICES AND INITIATE ST SERVICES 
FROM:



[facility]

(P) [phone]

(F) [phone]

## 2019-07-07 NOTE — NUR
JESSA SPOKE TO PATIENT DAUGHTER AND POA NANETTE NEVAREZ [phone] WITH BEDSIDE NEGRO MARK 
REGARDING RESUMPTION OF HOME HEALTH SERVICES. NANETTE GAVE TELEPHONE CONSENT TO RESUME HOME 
HEALTH SERVICES WITH [email or web address]. CLINICAL PENDING FAX ONCE [employer] GIVES 
WORKING FAX NUMBER



[email or web address] 

(P) [phone]

(F) PENDING

-------------------------------------------------------------------------------

Addendum: 07/07/19 at 1020 by Leela Black CM

-------------------------------------------------------------------------------

FAX: [phone]

## 2019-07-08 NOTE — DISCHARGE SUMMARY
ADMISSION DIAGNOSES:  Right upper lobe pneumonia present on admission, hypertension,

hyperlipidemia, dementia, poor p.o. intake, rhabdomyolysis, transaminitis. 

 

DISCHARGE DIAGNOSES:  Right upper lobe pneumonia present on admission, hypertension,

hyperlipidemia, dementia, poor p.o. intake, rhabdomyolysis, transaminitis, rule out

urinary tract infection, rule out bacteremia, rule out cerebrovascular accident. 

 

MEDICAL HISTORY:  The patient has a history of hypertension, hyperlipidemia, and

dementia. 

 

SURGICAL HISTORY:  Cholecystectomy, hip surgery.

 

FAMILY HISTORY:  Noncontributory.

 

SOCIAL HISTORY:  The patient admits to tobacco use, but quit in her 30s.

 

HOSPITAL COURSE:  An 83-year-old female sent from Dr. Limon's office for bacteremia

per son's report.  They deny elevated WBC at the PCP's office.  They deny cough,

diarrhea, fever and dysuria complaints.  According to the son, her AMS is slowly

increasing due to her dementia.  On admission, her WBC was 14.66.  The patient was

started on Zithromax, Rocephin, nebs, Mucinex.  Pulmonary was consulted. 

 

Chest x-ray showed right upper lobe pneumonia.  CT of the brain showed no acute

abnormalities.  Repeat chest x-ray showed bilateral upper lobe hazy opacities, right

greater than left, concerning for pneumonia.  Blood cultures were negative.  Urine

culture was negative. 

 

The patient will be discharged home with new prescription for Norvasc, megace, Omnicef,

Zithromax, and Mucinex.  She will follow up with primary care in 1-2 weeks and home

health was arranged with physical therapy.  The patient lives at home and has providers

most of the day.  Vital signs stable.  The patient is afebrile.  The patient's daughter

understands discharge instructions and agrees to plan. 

 

 

Dictated by Kita Mckay NP

 

______________________________

MD CHARO Klein/RAJENDRA

D:  07/08/2019 14:17:48

T:  07/08/2019 14:49:27

Job #:  [id number]

## 2019-07-11 NOTE — DIAGNOSTIC IMAGING REPORT
Examination: Single AP view of the chest.



COMPARISON: 7/4/2019, images are only now submitted for interpretation on

7/11/2019



INDICATION: Pneumonia

     

DISCUSSION:



The lungs remain well-inflated. Unchanged right upper lobe patchy

consolidation. Left upper lobe opacities described on the comparison are less

conspicuous on the current study. No pleural effusion or pneumothorax.



Stable cardiomediastinal contour with atherosclerotic calcification of the

thoracic aorta. No acute osseous abnormality. Atherosclerotic vascular

calcifications.



Round peripherally calcified structure in the left upper quadrant may represent

a splenic artery aneurysm.



IMPRESSION:

 

Right upper lobe pneumonia without significant interval change relative to

7/4/2019.



Signed by: Dr. Gustavo Solorio M.D. on 7/11/2019 8:19 AM